# Patient Record
Sex: MALE | Race: WHITE | NOT HISPANIC OR LATINO | Employment: FULL TIME | URBAN - NONMETROPOLITAN AREA
[De-identification: names, ages, dates, MRNs, and addresses within clinical notes are randomized per-mention and may not be internally consistent; named-entity substitution may affect disease eponyms.]

---

## 2017-06-10 ENCOUNTER — HOSPITAL ENCOUNTER (EMERGENCY)
Facility: HOSPITAL | Age: 57
End: 2017-06-10
Attending: EMERGENCY MEDICINE | Admitting: EMERGENCY MEDICINE

## 2017-06-10 DIAGNOSIS — I46.9 CARDIAC ARREST (HCC): Primary | ICD-10-CM

## 2017-06-10 LAB
ABO GROUP BLD: NORMAL
BLD GP AB SCN SERPL QL: NEGATIVE
RH BLD: POSITIVE

## 2017-06-10 PROCEDURE — 94799 UNLISTED PULMONARY SVC/PX: CPT

## 2017-06-10 PROCEDURE — 92950 HEART/LUNG RESUSCITATION CPR: CPT

## 2017-06-10 PROCEDURE — P9016 RBC LEUKOCYTES REDUCED: HCPCS

## 2017-06-10 PROCEDURE — 94770: CPT

## 2017-06-10 PROCEDURE — 86920 COMPATIBILITY TEST SPIN: CPT

## 2017-06-10 PROCEDURE — 99284 EMERGENCY DEPT VISIT MOD MDM: CPT

## 2017-06-10 PROCEDURE — 86901 BLOOD TYPING SEROLOGIC RH(D): CPT | Performed by: EMERGENCY MEDICINE

## 2017-06-10 PROCEDURE — 25010000002 EPINEPHRINE 0.1 MG/ML SOLUTION PREFILLED SYRINGE

## 2017-06-10 PROCEDURE — 86900 BLOOD TYPING SEROLOGIC ABO: CPT | Performed by: EMERGENCY MEDICINE

## 2017-06-10 PROCEDURE — 25010000002 EPINEPHRINE 0.1 MG/ML SOLUTION PREFILLED SYRINGE: Performed by: EMERGENCY MEDICINE

## 2017-06-10 PROCEDURE — 36430 TRANSFUSION BLD/BLD COMPNT: CPT

## 2017-06-10 PROCEDURE — 86900 BLOOD TYPING SEROLOGIC ABO: CPT

## 2017-06-10 PROCEDURE — 86850 RBC ANTIBODY SCREEN: CPT | Performed by: EMERGENCY MEDICINE

## 2017-06-10 RX ADMIN — EPINEPHRINE 1 MG: 0.1 INJECTION, SOLUTION ENDOTRACHEAL; INTRACARDIAC; INTRAVENOUS at 11:22

## 2017-06-10 RX ADMIN — EPINEPHRINE 1 MG: 0.1 INJECTION, SOLUTION ENDOTRACHEAL; INTRACARDIAC; INTRAVENOUS at 11:34

## 2017-06-10 RX ADMIN — EPINEPHRINE 1 MG: 0.1 INJECTION, SOLUTION ENDOTRACHEAL; INTRACARDIAC; INTRAVENOUS at 11:38

## 2017-06-10 RX ADMIN — EPINEPHRINE 1 MG: 0.1 INJECTION, SOLUTION ENDOTRACHEAL; INTRACARDIAC; INTRAVENOUS at 11:27

## 2017-06-10 RX ADMIN — EPINEPHRINE 1 MG: 0.1 INJECTION, SOLUTION ENDOTRACHEAL; INTRACARDIAC; INTRAVENOUS at 11:43

## 2017-06-10 RX ADMIN — EPINEPHRINE 1 MG: 0.1 INJECTION, SOLUTION ENDOTRACHEAL; INTRACARDIAC; INTRAVENOUS at 11:30

## 2017-06-10 NOTE — ED PROVIDER NOTES
TRIAGE CHIEF COMPLAINT:     Nursing and triage notes reviewed    Chief Complaint   Patient presents with   • Cardiac Arrest      HPI: Jason Cortez is a 56 y.o. male who presents to the emergency department complaining of Cardiac arrest.  Patient is brought in by EMS.  The patient was found unresponsive lying in a pool of blood at work.  Patient was seen normal at 8:00 this morning.  EMS arrived around 10:30.  They state patient initially was breathing and then stopped breathing with no pulse around 1045, approximately 25 minutes prior to arrival.  Patient initially in PEA and then asystole.  Patient had been given several doses of epinephrine and IV fluids started prior to patient's arrival in the emergency department.  EMS was not able to find any active bleeding at that time.  Patient had been intubated prior to arrival.     REVIEW OF SYSTEMS: Otherwise negative unless stated above     PAST MEDICAL HISTORY:   No past medical history on file.     FAMILY HISTORY:   No family history on file.     SOCIAL HISTORY:   Social History     Social History   • Marital status:      Spouse name: N/A   • Number of children: N/A   • Years of education: N/A     Occupational History   • Not on file.     Social History Main Topics   • Smoking status: Not on file   • Smokeless tobacco: Not on file   • Alcohol use Not on file   • Drug use: Not on file   • Sexual activity: Not on file     Other Topics Concern   • Not on file     Social History Narrative        SURGICAL HISTORY:   No past surgical history on file.     CURRENT MEDICATIONS:      Medication List      Notice     You have not been prescribed any medications.         ALLERGIES: Review of patient's allergies indicates not on file.     PHYSICAL EXAM:   VITAL SIGNS: There were no vitals filed for this visit.   CONSTITUTIONAL: Unresponsive in cardiac arrest   HENT: Atraumatic, normocephalic, endotracheal tube in place in the oropharynx.  No obvious signs of trauma to the  head.  EYES: Conjunctiva clear, pupils are approximately 4-5 mm and nonreactive bilaterally   NECK: Trachea midline   CARDIOVASCULAR: Asystole   PULMONARY/CHEST: Worse lung sounds with manual ventilation  ABDOMINAL: Non-distended, soft  NEUROLOGIC: Unresponsive, pupils nonreactive   EXTREMITIES: A large amount of dried blood on the right lower extremity below the knee.  Initially no active bleeding, after leg scrubbed clean small 2 mm open area with active bleeding is seen.   SKIN: Warm, Dry, No erythema, No rash     ED COURSE / MEDICAL DECISION MAKING:   Jason Cortez is a 56 y.o. male who presents to the emergency department in cardiac arrest.  Patient has been down for approximately 40 minutes prior to arrival and in cardiac arrest approximately 25-30 minutes prior to arrival.  Patient had been intubated in the field.  Had been given several doses of epinephrine.  Patient placed on monitor on arrival and was found to be in asystole with occasional heartbeat.  No pulse was felt.  CPR was continued.  Patient started on IV fluids.  Blood was called from the blood bank.  Rectal exam was performed and did not reveal any sign of gross blood.  No blood in the oropharynx.  Use a laryngoscope to check placement of endotracheal tube which was seen to be going through the vocal cords.  Endotracheal tube moved back 2 cm as well as lung sounds on the left were decreased when compared to that of the right.  These improved after the tube was moved.  Patient was cleaned and this is when bleeding area started again on the right mid calf.  Steady stream of dark nonpulsatile blood.  This is presumed ruptured varicose vein.  Using a 2-0 chromic gut suture a figure-of-eight suture was placed which did achieve successful hemostasis.  A pressure dressing was placed after.  Through multiple rhythm checks patient remained in asystole.  Multiple doses of epinephrine were given.  Several bedside ultrasounds were performed by myself which  revealed no spontaneous cardiac activity.  After one unit of blood fluid was given patient still had no spontaneous cardiac activity and no pulse.  At that time the resuscitation was called at 11:47.     DECISION TO DISCHARGE/ADMIT: see ED care timeline     FINAL IMPRESSION:   1 -- cardiac arrest   2 --   3 --     Electronically signed by: Emely Solano MD, 6/10/2017 12:00 PM       Emely Solano MD  06/10/17 1214

## 2017-06-10 NOTE — CODE DOCUMENTATION
Code blue continues.  In assessing for active bleeding, clot dislodged rt mid calf with steady stream of dark blood.  Dr. Solano sutured bleed. Pressure dressing applied.

## 2017-06-10 NOTE — SIGNIFICANT NOTE
Pt found unresponsive lying on floor in pool of blood at work.  EMS state pt initially was breathing and then stopped breathing after their arrival.

## 2017-06-10 NOTE — CODE DOCUMENTATION
IV started #20 g Lt ac per Janna LOPEZ.  NS 1000 ml hung infusing at WOR.  Pt.had IO in place lt leg with NS infusing wor.  Blood infusing per rt AC IV line WOR.  Code blue continues.

## 2017-06-13 LAB
ABO + RH BLD: NORMAL
BH BB BLOOD EXPIRATION DATE: NORMAL
BH BB BLOOD TYPE BARCODE: 9500
BH BB DISPENSE STATUS: NORMAL
BH BB PRODUCT CODE: NORMAL
BH BB UNIT NUMBER: NORMAL
CROSSMATCH INTERPRETATION: NORMAL
UNIT  ABO: NORMAL
UNIT  RH: NORMAL